# Patient Record
Sex: MALE | Race: WHITE | NOT HISPANIC OR LATINO | Employment: UNEMPLOYED | URBAN - METROPOLITAN AREA
[De-identification: names, ages, dates, MRNs, and addresses within clinical notes are randomized per-mention and may not be internally consistent; named-entity substitution may affect disease eponyms.]

---

## 2021-07-24 VITALS
BODY MASS INDEX: 24.1 KG/M2 | TEMPERATURE: 99.1 F | WEIGHT: 177.91 LBS | HEIGHT: 72 IN | RESPIRATION RATE: 18 BRPM | SYSTOLIC BLOOD PRESSURE: 97 MMHG | OXYGEN SATURATION: 92 % | HEART RATE: 117 BPM | DIASTOLIC BLOOD PRESSURE: 64 MMHG

## 2021-07-24 PROCEDURE — 99284 EMERGENCY DEPT VISIT MOD MDM: CPT

## 2021-07-25 ENCOUNTER — APPOINTMENT (OUTPATIENT)
Dept: RADIOLOGY | Facility: MEDICAL CENTER | Age: 36
End: 2021-07-25
Attending: EMERGENCY MEDICINE
Payer: COMMERCIAL

## 2021-07-25 ENCOUNTER — HOSPITAL ENCOUNTER (EMERGENCY)
Facility: MEDICAL CENTER | Age: 36
End: 2021-07-25
Attending: EMERGENCY MEDICINE
Payer: COMMERCIAL
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
## 2021-07-25 DIAGNOSIS — S09.90XA CLOSED HEAD INJURY, INITIAL ENCOUNTER: ICD-10-CM

## 2021-07-25 DIAGNOSIS — S09.93XA FACIAL INJURY, INITIAL ENCOUNTER: ICD-10-CM

## 2021-07-25 DIAGNOSIS — F10.929 ACUTE ALCOHOLIC INTOXICATION WITH COMPLICATION (HCC): ICD-10-CM

## 2021-07-25 LAB
ALBUMIN SERPL BCP-MCNC: 5 G/DL (ref 3.2–4.9)
ALBUMIN/GLOB SERPL: 2.3 G/DL
ALP SERPL-CCNC: 91 U/L (ref 30–99)
ALT SERPL-CCNC: 22 U/L (ref 2–50)
ANION GAP SERPL CALC-SCNC: 16 MMOL/L (ref 7–16)
APTT PPP: 24.4 SEC (ref 24.7–36)
AST SERPL-CCNC: 29 U/L (ref 12–45)
BASOPHILS # BLD AUTO: 0.6 % (ref 0–1.8)
BASOPHILS # BLD: 0.07 K/UL (ref 0–0.12)
BILIRUB SERPL-MCNC: 1.4 MG/DL (ref 0.1–1.5)
BUN SERPL-MCNC: 13 MG/DL (ref 8–22)
CALCIUM SERPL-MCNC: 9.2 MG/DL (ref 8.5–10.5)
CHLORIDE SERPL-SCNC: 107 MMOL/L (ref 96–112)
CO2 SERPL-SCNC: 21 MMOL/L (ref 20–33)
CREAT SERPL-MCNC: 1.03 MG/DL (ref 0.5–1.4)
EOSINOPHIL # BLD AUTO: 0.04 K/UL (ref 0–0.51)
EOSINOPHIL NFR BLD: 0.3 % (ref 0–6.9)
ERYTHROCYTE [DISTWIDTH] IN BLOOD BY AUTOMATED COUNT: 40.1 FL (ref 35.9–50)
ETHANOL BLD-MCNC: 250.2 MG/DL (ref 0–10)
GLOBULIN SER CALC-MCNC: 2.2 G/DL (ref 1.9–3.5)
GLUCOSE SERPL-MCNC: 104 MG/DL (ref 65–99)
HCT VFR BLD AUTO: 52.4 % (ref 42–52)
HGB BLD-MCNC: 18 G/DL (ref 14–18)
IMM GRANULOCYTES # BLD AUTO: 0.04 K/UL (ref 0–0.11)
IMM GRANULOCYTES NFR BLD AUTO: 0.3 % (ref 0–0.9)
INR PPP: 1.04 (ref 0.87–1.13)
LYMPHOCYTES # BLD AUTO: 2.16 K/UL (ref 1–4.8)
LYMPHOCYTES NFR BLD: 18.5 % (ref 22–41)
MCH RBC QN AUTO: 29.7 PG (ref 27–33)
MCHC RBC AUTO-ENTMCNC: 34.4 G/DL (ref 33.7–35.3)
MCV RBC AUTO: 86.5 FL (ref 81.4–97.8)
MONOCYTES # BLD AUTO: 0.4 K/UL (ref 0–0.85)
MONOCYTES NFR BLD AUTO: 3.4 % (ref 0–13.4)
NEUTROPHILS # BLD AUTO: 8.95 K/UL (ref 1.82–7.42)
NEUTROPHILS NFR BLD: 76.9 % (ref 44–72)
NRBC # BLD AUTO: 0 K/UL
NRBC BLD-RTO: 0 /100 WBC
PLATELET # BLD AUTO: 213 K/UL (ref 164–446)
PMV BLD AUTO: 10.7 FL (ref 9–12.9)
POTASSIUM SERPL-SCNC: 4.1 MMOL/L (ref 3.6–5.5)
PROT SERPL-MCNC: 7.2 G/DL (ref 6–8.2)
PROTHROMBIN TIME: 13.3 SEC (ref 12–14.6)
RBC # BLD AUTO: 6.06 M/UL (ref 4.7–6.1)
SODIUM SERPL-SCNC: 144 MMOL/L (ref 135–145)
WBC # BLD AUTO: 11.7 K/UL (ref 4.8–10.8)

## 2021-07-25 PROCEDURE — 82077 ASSAY SPEC XCP UR&BREATH IA: CPT

## 2021-07-25 PROCEDURE — 80053 COMPREHEN METABOLIC PANEL: CPT

## 2021-07-25 PROCEDURE — 85025 COMPLETE CBC W/AUTO DIFF WBC: CPT

## 2021-07-25 PROCEDURE — 70486 CT MAXILLOFACIAL W/O DYE: CPT

## 2021-07-25 PROCEDURE — 304999 HCHG REPAIR-SIMPLE/INTERMED LEVEL 1

## 2021-07-25 PROCEDURE — 85610 PROTHROMBIN TIME: CPT

## 2021-07-25 PROCEDURE — 304217 HCHG IRRIGATION SYSTEM

## 2021-07-25 PROCEDURE — 303747 HCHG EXTRA SUTURE

## 2021-07-25 PROCEDURE — 72125 CT NECK SPINE W/O DYE: CPT

## 2021-07-25 PROCEDURE — 85730 THROMBOPLASTIN TIME PARTIAL: CPT

## 2021-07-25 PROCEDURE — 70450 CT HEAD/BRAIN W/O DYE: CPT

## 2021-07-25 RX ORDER — SODIUM CHLORIDE 9 MG/ML
1000 INJECTION, SOLUTION INTRAVENOUS ONCE
Status: DISCONTINUED | OUTPATIENT
Start: 2021-07-25 | End: 2021-07-25 | Stop reason: HOSPADM

## 2021-07-25 NOTE — ED NOTES
PT restless and not re directing easily. PT removed IV bleeding controled.  PT able to walk around room with a steady gait
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
## 2021-07-25 NOTE — ED PROVIDER NOTES
ED Provider Note    0500 -patient signed out to me by my colleague, Dr. Curran, for reevaluation of final disposition after CT results and upon sobriety.  Please refer to his note for complete details.    CT-MAXILLOFACIAL W/O PLUS RECONS   Final Result      1.  Nondisplaced fracture involving the left mandibular ramus superiorly adjacent to the base of the mandibular condyle.      2.  Soft tissue injury.      CT-HEAD W/O   Final Result      No evidence of acute intracranial process.      CT-CSPINE WITHOUT PLUS RECONS   Final Result      No evidence of cervical spine fracture.        0600 -CTs as above.  Patient has nondisplaced fracture involving the left mandibular ramus.  CT head is unremarkable.  CT cervical spine is unremarkable.  He is sleeping, will discuss findings with him and reevaluate upon sobriety.    0631 -notified by nursing staff that patient has eloped.  Refused to wait for discussion or update on imaging.
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
## 2021-09-07 ENCOUNTER — TELEPHONE (OUTPATIENT)
Dept: SCHEDULING | Facility: IMAGING CENTER | Age: 36
End: 2021-09-07

## 2022-03-24 ENCOUNTER — OFFICE VISIT (OUTPATIENT)
Dept: URGENT CARE | Facility: CLINIC | Age: 37
End: 2022-03-24
Payer: COMMERCIAL

## 2022-03-24 VITALS
DIASTOLIC BLOOD PRESSURE: 68 MMHG | BODY MASS INDEX: 23.49 KG/M2 | OXYGEN SATURATION: 98 % | WEIGHT: 173.4 LBS | SYSTOLIC BLOOD PRESSURE: 122 MMHG | HEIGHT: 72 IN | RESPIRATION RATE: 18 BRPM | HEART RATE: 88 BPM | TEMPERATURE: 100.1 F

## 2022-03-24 DIAGNOSIS — J02.9 EXUDATIVE PHARYNGITIS: ICD-10-CM

## 2022-03-24 LAB
INT CON NEG: NORMAL
INT CON POS: NORMAL
S PYO AG THROAT QL: NEGATIVE

## 2022-03-24 PROCEDURE — 99203 OFFICE O/P NEW LOW 30 MIN: CPT | Performed by: PHYSICIAN ASSISTANT

## 2022-03-24 PROCEDURE — 87880 STREP A ASSAY W/OPTIC: CPT | Performed by: PHYSICIAN ASSISTANT

## 2022-03-24 RX ORDER — QUETIAPINE FUMARATE 200 MG/1
200 TABLET, FILM COATED ORAL
COMMUNITY

## 2022-03-24 RX ORDER — DEXAMETHASONE SODIUM PHOSPHATE 4 MG/ML
4 INJECTION, SOLUTION INTRA-ARTICULAR; INTRALESIONAL; INTRAMUSCULAR; INTRAVENOUS; SOFT TISSUE ONCE
Status: COMPLETED | OUTPATIENT
Start: 2022-03-24 | End: 2022-03-24

## 2022-03-24 RX ORDER — DEXTROAMPHETAMINE SACCHARATE, AMPHETAMINE ASPARTATE, DEXTROAMPHETAMINE SULFATE AND AMPHETAMINE SULFATE 5; 5; 5; 5 MG/1; MG/1; MG/1; MG/1
20 TABLET ORAL 2 TIMES DAILY
COMMUNITY

## 2022-03-24 RX ORDER — LIDOCAINE HYDROCHLORIDE 20 MG/ML
5 SOLUTION OROPHARYNGEAL PRN
Qty: 120 ML | Refills: 0 | Status: SHIPPED | OUTPATIENT
Start: 2022-03-24

## 2022-03-24 RX ADMIN — DEXAMETHASONE SODIUM PHOSPHATE 4 MG: 4 INJECTION, SOLUTION INTRA-ARTICULAR; INTRALESIONAL; INTRAMUSCULAR; INTRAVENOUS; SOFT TISSUE at 15:19

## 2022-03-24 ASSESSMENT — ENCOUNTER SYMPTOMS
DIARRHEA: 0
ABDOMINAL PAIN: 0
CHILLS: 1
VOMITING: 0
COUGH: 0
NAUSEA: 0
MYALGIAS: 1
SPUTUM PRODUCTION: 0
WHEEZING: 0
FEVER: 1
SHORTNESS OF BREATH: 0
SORE THROAT: 1

## 2022-03-24 ASSESSMENT — FIBROSIS 4 INDEX: FIB4 SCORE: 1.02

## 2022-03-24 NOTE — PROGRESS NOTES
Subjective:     Gorge Bailey  is a 37 y.o. male who presents for Pharyngitis (GLANDS SWOLLEN/CHEST CONGESTION/3DAYS)      Pharyngitis   Associated symptoms include congestion. Pertinent negatives include no abdominal pain, coughing, diarrhea, ear pain, shortness of breath or vomiting.   Patient presents urgent care complaining last 2 to 3 days of noting sore throat.  Complains of throat pain with swallowing saliva as well as liquids.  Complains of sensation of full and uncomfortable lymph nodes.  Has noted subjective fevers and chills over the last 2 days as well.  Denies cough.  Denies ear pain.  Complains of sinus congestion.  Denies nausea vomiting abdominal pain diarrhea or rash.  Denies known exposure to strep pharyngitis.  Has tried no treatments thus far.    Review of Systems   Constitutional: Positive for chills and fever ( subj).   HENT: Positive for congestion and sore throat. Negative for ear pain.    Respiratory: Negative for cough, sputum production, shortness of breath and wheezing.    Gastrointestinal: Negative for abdominal pain, diarrhea, nausea and vomiting.   Musculoskeletal: Positive for myalgias.   Skin: Negative for rash.       Medications:    • amphetamine-dextroamphetamine Tabs  • QUEtiapine Tabs    Allergies: Patient has no known allergies.    Problem List: Gorge Bailey does not have a problem list on file.    Surgical History:  No past surgical history on file.    Past Social Hx: Gorge Bailey  reports that he has never smoked. He has never used smokeless tobacco. He reports that he does not drink alcohol.     Past Family Hx:  Gorge Bailey family history is not on file.     Problem list, medications, and allergies reviewed by myself today in Epic.     Objective:   /68 (BP Location: Left arm, Patient Position: Sitting)   Pulse 88   Temp 37.8 °C (100.1 °F) (Temporal)   Resp 18   Ht 1.829 m (6')   Wt 78.7 kg (173 lb 6.4 oz)   SpO2 98%   BMI 23.52 kg/m²     Physical  Exam  Vitals and nursing note reviewed.   Constitutional:       General: He is not in acute distress.     Appearance: He is well-developed. He is not diaphoretic.   HENT:      Head: Normocephalic and atraumatic.      Right Ear: Tympanic membrane, ear canal and external ear normal.      Left Ear: Tympanic membrane, ear canal and external ear normal.      Nose: Nose normal.      Mouth/Throat:      Lips: Pink.      Mouth: Mucous membranes are moist.      Pharynx: Uvula midline. Oropharyngeal exudate and posterior oropharyngeal erythema (deeper) present.      Tonsils: No tonsillar exudate or tonsillar abscesses. 1+ on the right. 1+ on the left.   Eyes:      General: No scleral icterus.        Right eye: No discharge.         Left eye: No discharge.      Conjunctiva/sclera: Conjunctivae normal.   Pulmonary:      Effort: Pulmonary effort is normal. No respiratory distress.      Breath sounds: No decreased breath sounds, wheezing, rhonchi or rales.   Musculoskeletal:         General: Normal range of motion.      Cervical back: Neck supple.   Lymphadenopathy:      Cervical: Cervical adenopathy ( mild bilat) present.   Skin:     General: Skin is warm and dry.      Coloration: Skin is not pale.   Neurological:      Mental Status: He is alert and oriented to person, place, and time.      Coordination: Coordination normal.     POCT strep - NEG    Decadron 4 mg oral-tolerates well    Assessment/Plan:   Assessment      1. Exudative pharyngitis  - POCT Rapid Strep A  - dexamethasone (DECADRON) injection 4 mg  - lidocaine (XYLOCAINE) 2 % Solution; Take 5 mL by mouth as needed for Throat/Mouth Pain (q6hr PRN throat pain, ok to rinse and spit or swallow).  Dispense: 120 mL; Refill: 0    Other orders  - amphetamine-dextroamphetamine (ADDERALL) 20 MG Tab; Take 20 mg by mouth 2 times a day.  - QUEtiapine (SEROQUEL) 200 MG Tab; Take 200 mg by mouth at bedtime as needed.  May repeat 1 time..  Supportive care is reviewed with  patient/caregiver - recommend to push PO fluids and electrolytes,  take full course of Rx, take with probiotics, observe for resolution  Decadron and viscous lidocaine for discomfort  Return to clinic with lack of resolution or progression of symptoms.      I have worn an N95 mask, gloves and eye protection for the entire encounter with this patient.     Differential diagnosis, natural history, supportive care, and indications for immediate follow-up discussed.

## 2022-09-10 ENCOUNTER — HOSPITAL ENCOUNTER (EMERGENCY)
Facility: MEDICAL CENTER | Age: 37
End: 2022-09-10
Attending: EMERGENCY MEDICINE
Payer: COMMERCIAL

## 2022-09-10 VITALS
HEART RATE: 98 BPM | HEIGHT: 72 IN | BODY MASS INDEX: 23.43 KG/M2 | DIASTOLIC BLOOD PRESSURE: 72 MMHG | WEIGHT: 173 LBS | RESPIRATION RATE: 20 BRPM | SYSTOLIC BLOOD PRESSURE: 110 MMHG | OXYGEN SATURATION: 96 % | TEMPERATURE: 97.2 F

## 2022-09-10 DIAGNOSIS — F10.920 ACUTE ALCOHOLIC INTOXICATION WITHOUT COMPLICATION (HCC): ICD-10-CM

## 2022-09-10 DIAGNOSIS — F19.10: ICD-10-CM

## 2022-09-10 DIAGNOSIS — T50.901A ACCIDENTAL DRUG OVERDOSE, INITIAL ENCOUNTER: ICD-10-CM

## 2022-09-10 LAB
ALBUMIN SERPL BCP-MCNC: 4.5 G/DL (ref 3.2–4.9)
ALBUMIN/GLOB SERPL: 1.9 G/DL
ALP SERPL-CCNC: 86 U/L (ref 30–99)
ALT SERPL-CCNC: 20 U/L (ref 2–50)
AMPHET UR QL SCN: NEGATIVE
ANION GAP SERPL CALC-SCNC: 20 MMOL/L (ref 7–16)
AST SERPL-CCNC: 36 U/L (ref 12–45)
BARBITURATES UR QL SCN: NEGATIVE
BASOPHILS # BLD AUTO: 0.1 % (ref 0–1.8)
BASOPHILS # BLD: 0.01 K/UL (ref 0–0.12)
BENZODIAZ UR QL SCN: NEGATIVE
BILIRUB SERPL-MCNC: 0.7 MG/DL (ref 0.1–1.5)
BUN SERPL-MCNC: 13 MG/DL (ref 8–22)
BZE UR QL SCN: POSITIVE
CALCIUM SERPL-MCNC: 8.5 MG/DL (ref 8.5–10.5)
CANNABINOIDS UR QL SCN: NEGATIVE
CHLORIDE SERPL-SCNC: 106 MMOL/L (ref 96–112)
CO2 SERPL-SCNC: 16 MMOL/L (ref 20–33)
CREAT SERPL-MCNC: 1.08 MG/DL (ref 0.5–1.4)
EKG IMPRESSION: NORMAL
EOSINOPHIL # BLD AUTO: 0.03 K/UL (ref 0–0.51)
EOSINOPHIL NFR BLD: 0.4 % (ref 0–6.9)
ERYTHROCYTE [DISTWIDTH] IN BLOOD BY AUTOMATED COUNT: 44.1 FL (ref 35.9–50)
ETHANOL BLD-MCNC: 286.6 MG/DL
GFR SERPLBLD CREATININE-BSD FMLA CKD-EPI: 91 ML/MIN/1.73 M 2
GLOBULIN SER CALC-MCNC: 2.4 G/DL (ref 1.9–3.5)
GLUCOSE SERPL-MCNC: 139 MG/DL (ref 65–99)
HCT VFR BLD AUTO: 49.5 % (ref 42–52)
HGB BLD-MCNC: 16.8 G/DL (ref 14–18)
IMM GRANULOCYTES # BLD AUTO: 0.17 K/UL (ref 0–0.11)
IMM GRANULOCYTES NFR BLD AUTO: 2 % (ref 0–0.9)
LYMPHOCYTES # BLD AUTO: 1.76 K/UL (ref 1–4.8)
LYMPHOCYTES NFR BLD: 21 % (ref 22–41)
MCH RBC QN AUTO: 30.9 PG (ref 27–33)
MCHC RBC AUTO-ENTMCNC: 33.9 G/DL (ref 33.7–35.3)
MCV RBC AUTO: 91.2 FL (ref 81.4–97.8)
METHADONE UR QL SCN: NEGATIVE
MONOCYTES # BLD AUTO: 0.29 K/UL (ref 0–0.85)
MONOCYTES NFR BLD AUTO: 3.5 % (ref 0–13.4)
NEUTROPHILS # BLD AUTO: 6.11 K/UL (ref 1.82–7.42)
NEUTROPHILS NFR BLD: 73 % (ref 44–72)
NRBC # BLD AUTO: 0 K/UL
NRBC BLD-RTO: 0 /100 WBC
OPIATES UR QL SCN: NEGATIVE
OXYCODONE UR QL SCN: NEGATIVE
PCP UR QL SCN: NEGATIVE
PLATELET # BLD AUTO: 228 K/UL (ref 164–446)
PMV BLD AUTO: 10 FL (ref 9–12.9)
POTASSIUM SERPL-SCNC: 3.7 MMOL/L (ref 3.6–5.5)
PROPOXYPH UR QL SCN: NEGATIVE
PROT SERPL-MCNC: 6.9 G/DL (ref 6–8.2)
RBC # BLD AUTO: 5.43 M/UL (ref 4.7–6.1)
SODIUM SERPL-SCNC: 142 MMOL/L (ref 135–145)
TROPONIN T SERPL-MCNC: 7 NG/L (ref 6–19)
WBC # BLD AUTO: 8.4 K/UL (ref 4.8–10.8)

## 2022-09-10 PROCEDURE — 93005 ELECTROCARDIOGRAM TRACING: CPT

## 2022-09-10 PROCEDURE — 80053 COMPREHEN METABOLIC PANEL: CPT

## 2022-09-10 PROCEDURE — 93005 ELECTROCARDIOGRAM TRACING: CPT | Performed by: EMERGENCY MEDICINE

## 2022-09-10 PROCEDURE — 82077 ASSAY SPEC XCP UR&BREATH IA: CPT

## 2022-09-10 PROCEDURE — 84484 ASSAY OF TROPONIN QUANT: CPT

## 2022-09-10 PROCEDURE — 80307 DRUG TEST PRSMV CHEM ANLYZR: CPT

## 2022-09-10 PROCEDURE — 99285 EMERGENCY DEPT VISIT HI MDM: CPT

## 2022-09-10 PROCEDURE — 36415 COLL VENOUS BLD VENIPUNCTURE: CPT

## 2022-09-10 PROCEDURE — 700105 HCHG RX REV CODE 258: Performed by: EMERGENCY MEDICINE

## 2022-09-10 PROCEDURE — 85025 COMPLETE CBC W/AUTO DIFF WBC: CPT

## 2022-09-10 RX ORDER — SODIUM CHLORIDE 9 MG/ML
1000 INJECTION, SOLUTION INTRAVENOUS ONCE
Status: COMPLETED | OUTPATIENT
Start: 2022-09-10 | End: 2022-09-10

## 2022-09-10 RX ADMIN — SODIUM CHLORIDE 1000 ML: 9 INJECTION, SOLUTION INTRAVENOUS at 04:29

## 2022-09-10 ASSESSMENT — FIBROSIS 4 INDEX: FIB4 SCORE: 1.02

## 2022-09-10 NOTE — DISCHARGE INSTRUCTIONS
You likely had fentanyl laced in the drugs that you are using tonight.  Please be cautious in the future as this could have caused you to die from a nonintentional overdose.  Increase fluids for the next 24 hours that are nonalcoholic.  Return if problems

## 2022-09-10 NOTE — ED PROVIDER NOTES
ED Provider Note     Scribed for Jovana Mcnamara D.O. by Joe Dean. 9/10/2022, 3:45 AM.     Primary care provider: Pcp Pt States None  Means of arrival: EMS         History obtained from: Patient  History limited by: None    CHIEF COMPLAINT  Chief Complaint   Patient presents with    Drug Overdose       HPI  Gorge Bailey is a 37 y.o. male who presents to the emergency Department via EMS for drug overdose. The patient arrived via EMS after a positive response to Narcan when found unresponsive. The patient admits to using cocaine with friends, but denies using heroin, however he admits that he did not know what the cocaine could have been laced with. The patient denies any history of heroin use.    REVIEW OF SYSTEMS  Pertinent positives include positive response to Narcan. Pertinent negatives include no fever.   See HPI for further details. All other systems are negative.    PAST MEDICAL HISTORY  Not noted.    FAMILY HISTORY  Not noted.    SOCIAL HISTORY  Social History     Tobacco Use    Smoking status: Never    Smokeless tobacco: Never    Tobacco comments:     unknown, pt refusing to answer   Vaping Use    Vaping Use: Unknown   Substance Use Topics    Alcohol use: Never      Social History     Substance and Sexual Activity   Drug Use Cocaine 9/10/22    Comment: unknown, pt refusing to answer       SURGICAL HISTORY  Not noted.    CURRENT MEDICATIONS  No current facility-administered medications for this encounter.    Current Outpatient Medications:     amphetamine-dextroamphetamine (ADDERALL) 20 MG Tab, Take 20 mg by mouth 2 times a day., Disp: , Rfl:     QUEtiapine (SEROQUEL) 200 MG Tab, Take 200 mg by mouth at bedtime as needed.  May repeat 1 time.., Disp: , Rfl:     lidocaine (XYLOCAINE) 2 % Solution, Take 5 mL by mouth as needed for Throat/Mouth Pain (q6hr PRN throat pain, ok to rinse and spit or swallow)., Disp: 120 mL, Rfl: 0    ALLERGIES  No Known Allergies    PHYSICAL EXAM  VITAL SIGNS: /76   Pulse  (!) 105   Temp 36.1 °C (97 °F) (Temporal)   Resp 20   Ht 1.829 m (6')   Wt 78.5 kg (173 lb)   SpO2 91%   BMI 23.46 kg/m²     Constitutional: Patient is awake and alert. well developed, well nourished. Strong scent of alcohol on his breath. No acute distress.   HENT: Normocephalic, atraumatic. Nose normal with no mucosal edema or drainage. Oropharynx moist without erythema or exudates.  Eyes: Pin-point pupils, EOMI, Conjunctiva without erythema or exudates.   Neck: Supple   Lymphatic: No lymphadenopathy noted.   Cardiovascular: Normal heart rate and Regular rhythm. No murmur  Thorax & Lungs: Clear and equal breath sounds with good excursion. No respiratory distress, no rhonchi, wheezing or rales. No chest tenderness,  or signs of trauma .  Abdomen: Bowel sounds normal in all four quadrants. Soft,nontender  Skin: Warm, Dry, multiple tattoos, no rashes or abrasions  Back: No cervical, thoracic, or lumbosacral tenderness.   Extremities: Peripheral pulses 4/4 No edema, No tenderness  Musculoskeletal: Normal range of motion in all major joints. No tenderness to palpation or major deformities noted.   Neurologic: Alert & oriented x 3, Normal motor function, Normal sensory function, No lateralizing or focal deficits noted. DTR's 4/4 bilaterally.  Psychiatric: Affect very argumentative, judgment is impaired due to alcohol and drugs    DIAGNOSTICS/PROCEDURES    LABS  Results for orders placed or performed during the hospital encounter of 09/10/22   DIAGNOSTIC ALCOHOL   Result Value Ref Range    Diagnostic Alcohol 286.6 (H) <10.1 mg/dL   URINE DRUG SCREEN   Result Value Ref Range    Amphetamines Urine Negative Negative    Barbiturates Negative Negative    Benzodiazepines Negative Negative    Cocaine Metabolite Positive (A) Negative    Methadone Negative Negative    Opiates Negative Negative    Oxycodone Negative Negative    Phencyclidine -Pcp Negative Negative    Propoxyphene Negative Negative    Cannabinoid Metab  Negative Negative   CBC WITH DIFFERENTIAL   Result Value Ref Range    WBC 8.4 4.8 - 10.8 K/uL    RBC 5.43 4.70 - 6.10 M/uL    Hemoglobin 16.8 14.0 - 18.0 g/dL    Hematocrit 49.5 42.0 - 52.0 %    MCV 91.2 81.4 - 97.8 fL    MCH 30.9 27.0 - 33.0 pg    MCHC 33.9 33.7 - 35.3 g/dL    RDW 44.1 35.9 - 50.0 fL    Platelet Count 228 164 - 446 K/uL    MPV 10.0 9.0 - 12.9 fL    Neutrophils-Polys 73.00 (H) 44.00 - 72.00 %    Lymphocytes 21.00 (L) 22.00 - 41.00 %    Monocytes 3.50 0.00 - 13.40 %    Eosinophils 0.40 0.00 - 6.90 %    Basophils 0.10 0.00 - 1.80 %    Immature Granulocytes 2.00 (H) 0.00 - 0.90 %    Nucleated RBC 0.00 /100 WBC    Neutrophils (Absolute) 6.11 1.82 - 7.42 K/uL    Lymphs (Absolute) 1.76 1.00 - 4.80 K/uL    Monos (Absolute) 0.29 0.00 - 0.85 K/uL    Eos (Absolute) 0.03 0.00 - 0.51 K/uL    Baso (Absolute) 0.01 0.00 - 0.12 K/uL    Immature Granulocytes (abs) 0.17 (H) 0.00 - 0.11 K/uL    NRBC (Absolute) 0.00 K/uL   Comp Metabolic Panel   Result Value Ref Range    Sodium 142 135 - 145 mmol/L    Potassium 3.7 3.6 - 5.5 mmol/L    Chloride 106 96 - 112 mmol/L    Co2 16 (L) 20 - 33 mmol/L    Anion Gap 20.0 (H) 7.0 - 16.0    Glucose 139 (H) 65 - 99 mg/dL    Bun 13 8 - 22 mg/dL    Creatinine 1.08 0.50 - 1.40 mg/dL    Calcium 8.5 8.5 - 10.5 mg/dL    AST(SGOT) 36 12 - 45 U/L    ALT(SGPT) 20 2 - 50 U/L    Alkaline Phosphatase 86 30 - 99 U/L    Total Bilirubin 0.7 0.1 - 1.5 mg/dL    Albumin 4.5 3.2 - 4.9 g/dL    Total Protein 6.9 6.0 - 8.2 g/dL    Globulin 2.4 1.9 - 3.5 g/dL    A-G Ratio 1.9 g/dL   TROPONIN   Result Value Ref Range    Troponin T 7 6 - 19 ng/L   ESTIMATED GFR   Result Value Ref Range    GFR (CKD-EPI) 91 >60 mL/min/1.73 m 2   EKG (NOW)   Result Value Ref Range    Report       [facility] Emergency Dept.    Test Date:  2022-09-10  Pt Name:    WALTER MARCOS               Department: ER  MRN:        [id number]                      Room:       RD 02  Gender:     Male                          Technician: [id number]  :        1985                   Requested By:ER TRIAGE PROTOCOL  Order #:    [id number]                    Reading MD:    Measurements  Intervals                                Axis  Rate:       106                          P:          49  NV:         150                          QRS:        31  QRSD:       100                          T:          33  QT:         364  QTc:        484    Interpretive Statements  Sinus tachycardia  Borderline prolonged QT interval  No previous ECG available for comparison        Labs reviewed by me    EKG  Results for orders placed or performed during the hospital encounter of 09/10/22   EKG (NOW)   Result Value Ref Range    Report       [facility] Emergency Dept.    Test Date:  2022-09-10  Pt Name:    WALTER RODRÍGUEZ               Department: ER  MRN:        [id number]                      Room:        02  Gender:     Male                         Technician: [id number]  :        1985                   Requested By:ER TRIAGE PROTOCOL  Order #:    [id number]                    Reading MD:    Measurements  Intervals                                Axis  Rate:       106                          P:          49  NV:         150                          QRS:        31  QRSD:       100                          T:          33  QT:         364  QTc:        484    Interpretive Statements  Sinus tachycardia  Borderline prolonged QT interval  No previous ECG available for comparison          COURSE & MEDICAL DECISION MAKING  Pertinent Labs & Imaging studies reviewed. (See chart for details)    3:45 AM - Patient seen and evaluated at bedside. Ordered for daignostic alcohol, urine drug screen to evaluate. Differential diagnoses include, but are not limited to, drug overdose    4:19 AM - Ordered NS infusion 1L to treat symptoms.    HYDRATION: Based on the patient's presentation of Tachycardia the patient was given IV fluids. IV Hydration was used  because oral hydration was not adequate alone. Upon recheck following hydration, the patient was improved.   Labs were unremarkable, twelve-lead EKG shows sinus tachycardia at a rate of 106 bpm.  His troponin is negative at 7.  His glucose was slightly elevated at 139, CO2 is 16, white count is normal H&H is stable and his blood alcohol level was 286.  He remained stable, urine drug screen is currently pending and I have told him he can look it up on [facility] for further information.  He is discharged in stable and improved condition.          FINAL IMPRESSION  1. Accidental drug overdose, initial encounter    2. Acute alcoholic intoxication without complication (HCC)    3. Polydrug abuse, continuous (HCC)         Joe GUILLEN (Scribe), am scribing for, and in the presence of, Jovana Mcnamara D.O..    Electronically signed by: Joe Dean (Yan), 9/10/2022    Jovana GUILLEN D.O. personally performed the services described in this documentation, as scribed by Joe Dean in my presence, and it is both accurate and complete.    The note accurately reflects work and decisions made by me.  Jovana Mcnamara D.O.  9/10/2022  6:40 AM

## 2022-09-10 NOTE — ED NOTES
Fluid bolus started. O2 turned off, pt maintaining saturation above 90%. No signs of respiratory depression noted.
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
## 2022-09-10 NOTE — ED NOTES
Pt brother, Tima Montaño, would like to know when patient is released from hospital.     [phone]

## 2024-05-01 ENCOUNTER — APPOINTMENT (OUTPATIENT)
Dept: BEHAVIORAL HEALTH | Facility: CLINIC | Age: 39
End: 2024-05-01

## 2024-05-01 DIAGNOSIS — F51.01 PRIMARY INSOMNIA: ICD-10-CM

## 2024-05-01 DIAGNOSIS — F90.2 ATTENTION DEFICIT HYPERACTIVITY DISORDER, COMBINED TYPE: ICD-10-CM

## 2024-05-01 DIAGNOSIS — F41.1 GENERALIZED ANXIETY DISORDER: ICD-10-CM

## 2024-05-01 PROCEDURE — 90792 PSYCH DIAG EVAL W/MED SRVCS: CPT | Performed by: PSYCHIATRY & NEUROLOGY

## 2024-05-01 RX ORDER — ALPRAZOLAM 1 MG/1
1 TABLET ORAL DAILY
Qty: 30 TABLET | Refills: 0 | Status: SHIPPED | OUTPATIENT
Start: 2024-05-01 | End: 2024-05-31

## 2024-05-01 RX ORDER — DEXTROAMPHETAMINE SACCHARATE, AMPHETAMINE ASPARTATE, DEXTROAMPHETAMINE SULFATE AND AMPHETAMINE SULFATE 7.5; 7.5; 7.5; 7.5 MG/1; MG/1; MG/1; MG/1
30 TABLET ORAL 2 TIMES DAILY
Qty: 60 TABLET | Refills: 0 | Status: SHIPPED | OUTPATIENT
Start: 2024-05-01 | End: 2024-05-31

## 2024-05-01 NOTE — PROGRESS NOTES
"[facility]   Therapy Progress Note      This provider informed the patient their medical records are totally confidential except for the use by other providers involved in their care, or if the patient signs a release, or to report instances of child or elder abuse, or if it is determined they are an immediate risk to harm themselves or others.    Name: Gorge Bailey  MRN: [id number]  : 1985  Age: 39 y.o.  Date of assessment: 2024  PCP: Pcp Pt States None      Present Illness:   Chart reviewed prior to seeing him in my office.  He is 39, single, never , and has no children.  He works as a  at a local restaurant.  He grew up in [place] until moving to [place] at age 11..  He was diagnosed in [place] at age 11 as having ADHD.  He is restless, distractible, and impulsive at times.  He has trouble completing projects and difficulty staying focused on movies and books.  He recalls disrupting class in grade school but was never physically aggressive.  He was never thought to have bipolar disorder.  He does experience \" crippling anxiety\".  Denies abrupt mood swings for no particular reason.  He denies spending sprees, episodes of herrera, and grandiosity.    Past Psych History:   See present illness.  No previous psychiatric hospitalization or suicidal attempt.    Substance Abuse History:  No alcohol or substance abuse problem.  He has 1 cocktail per month.    Family History:   He has 2 brothers.  His 39-year-old brother has ADHD.  His parents  when he was 22 years old.    Medical History:  No significant medical problems    Psych Medications:  He is currently on Adderall 30 mg twice daily.  He has taken Seroquel 100 to 200 mg at bedtime for insomnia.  Trazodone apparently did not help his insomnia.  He was previously on Xanax 0.5 mg years ago.    Allergies:   None known    Mental Status:   He is alert, oriented, and cooperative.  Relatedness is good.  Grooming is good.  " Speech is normal rate.  Anxious.  Memory is good.  Insight and judgment are good.  No indication of psychotic thinking.    Current Risk:       Suicidal: Not suicidal       Homicidal: Not homicidal       Self-Harm: No plan to harm self       Relapse (Low/Moderate/High): Moderate       Crisis Safety Plan Reviewed: Discussed with patient    Diagnosis:  ADHD  Generalized anxiety disorder  Insomnia    Treatment Plan:  The current treatment plan consists of quarterly psychiatric sessions designed to evaluate his ADHD, anxiety, and insomnia.    Duration will be for a minimum of 12 months and will be reviewed at each visit.    Goals: Improvement in ADHD symptoms and sleep with control of anxiety in order to prevent relapse due to the chronic nature of his behavioral health problems and mental illness.  Continue Adderall 30 mg twice daily.  Possible side effects discussed.  Use Xanax 1 mg for severe anxiety.      Lenny Holguin M.D.     This note was created using voice recognition software (Dragon). The accuracy of the dictation is limited by the abilities of the software. I have reviewed the note prior to signing, however some errors in grammar and context are still possible. If you have any questions related to this note please do not hesitate to contact our office.

## 2024-06-04 DIAGNOSIS — F90.2 ATTENTION DEFICIT HYPERACTIVITY DISORDER, COMBINED TYPE: ICD-10-CM

## 2024-06-04 DIAGNOSIS — F41.1 GENERALIZED ANXIETY DISORDER: ICD-10-CM

## 2024-06-04 RX ORDER — DEXTROAMPHETAMINE SACCHARATE, AMPHETAMINE ASPARTATE, DEXTROAMPHETAMINE SULFATE AND AMPHETAMINE SULFATE 7.5; 7.5; 7.5; 7.5 MG/1; MG/1; MG/1; MG/1
30 TABLET ORAL 2 TIMES DAILY
Qty: 60 TABLET | Refills: 0 | Status: SHIPPED | OUTPATIENT
Start: 2024-06-04 | End: 2024-07-04

## 2024-06-04 RX ORDER — ALPRAZOLAM 1 MG/1
1 TABLET ORAL DAILY
Qty: 30 TABLET | Refills: 0 | Status: SHIPPED | OUTPATIENT
Start: 2024-06-04 | End: 2024-07-04

## 2024-07-03 DIAGNOSIS — F41.1 GENERALIZED ANXIETY DISORDER: ICD-10-CM

## 2024-07-03 DIAGNOSIS — F90.2 ATTENTION DEFICIT HYPERACTIVITY DISORDER, COMBINED TYPE: ICD-10-CM

## 2024-07-03 RX ORDER — DEXTROAMPHETAMINE SACCHARATE, AMPHETAMINE ASPARTATE, DEXTROAMPHETAMINE SULFATE AND AMPHETAMINE SULFATE 7.5; 7.5; 7.5; 7.5 MG/1; MG/1; MG/1; MG/1
30 TABLET ORAL 2 TIMES DAILY
Qty: 60 TABLET | Refills: 0 | Status: SHIPPED | OUTPATIENT
Start: 2024-07-03 | End: 2024-08-02

## 2024-07-03 RX ORDER — ALPRAZOLAM 1 MG/1
1 TABLET ORAL DAILY
Qty: 30 TABLET | Refills: 0 | Status: SHIPPED | OUTPATIENT
Start: 2024-07-03 | End: 2024-08-02

## 2024-08-01 ENCOUNTER — APPOINTMENT (OUTPATIENT)
Dept: BEHAVIORAL HEALTH | Facility: CLINIC | Age: 39
End: 2024-08-01

## 2024-08-01 DIAGNOSIS — F90.2 ATTENTION DEFICIT HYPERACTIVITY DISORDER, COMBINED TYPE: ICD-10-CM

## 2024-08-01 DIAGNOSIS — F51.01 PRIMARY INSOMNIA: ICD-10-CM

## 2024-08-01 DIAGNOSIS — F41.1 GENERALIZED ANXIETY DISORDER: ICD-10-CM

## 2024-08-01 PROCEDURE — 99214 OFFICE O/P EST MOD 30 MIN: CPT | Performed by: PSYCHIATRY & NEUROLOGY

## 2024-08-01 RX ORDER — DEXTROAMPHETAMINE SACCHARATE, AMPHETAMINE ASPARTATE, DEXTROAMPHETAMINE SULFATE AND AMPHETAMINE SULFATE 7.5; 7.5; 7.5; 7.5 MG/1; MG/1; MG/1; MG/1
30 TABLET ORAL 2 TIMES DAILY
Qty: 60 TABLET | Refills: 0 | Status: SHIPPED | OUTPATIENT
Start: 2024-08-01 | End: 2024-08-29 | Stop reason: SDUPTHER

## 2024-08-01 RX ORDER — QUETIAPINE FUMARATE 200 MG/1
200 TABLET, FILM COATED ORAL NIGHTLY
Qty: 60 TABLET | Refills: 0 | Status: SHIPPED | OUTPATIENT
Start: 2024-08-01 | End: 2024-08-29 | Stop reason: SDUPTHER

## 2024-08-01 RX ORDER — ALPRAZOLAM 1 MG
1 TABLET ORAL 2 TIMES DAILY
Qty: 60 TABLET | Refills: 0 | Status: SHIPPED | OUTPATIENT
Start: 2024-08-01 | End: 2024-08-29 | Stop reason: SDUPTHER

## 2024-08-01 NOTE — PROGRESS NOTES
[facility]   Follow Up Assessment     This provider informed the patient their medical records are totally confidential except for the use by other providers involved in their care, or if the patient signs a release, or to report instances of child or elder abuse, or if it is determined they are an immediate risk to harm themselves or others.    Name: Gorge Bailey  MRN: [id number]  : 1985  Age: 39 y.o.  Date of assessment: 2024  PCP: Pcp Pt States None      Subjective:  Chart reviewed prior to seeing him in my office.  He was seen most recently on May 1.  Adderall 30 mg twice daily is working well for his ADHD.  He has felt more anxious recently and would like to change Xanax to 1 mg twice daily as needed.  Insomnia is a problem and he would like to restart Seroquel.    Objective:  He is alert, oriented, and cooperative.  Relatedness is good.  Grooming is good.  Speech is normal rate.  Anxious.  Memory is good.  Insight and judgment are good.  No indication of psychotic thinking.    Current Risk:       Suicidal: Not suicidal       Homicidal: Not homicidal       Self-Harm: No plan to harm self       Relapse: (Low/Moderate/High): Moderate       Crisis Safety Plan Reviewed: Discussed with patient    Diagnosis:   ADHD  Generalized anxiety disorder  Insomnia    Treatment Plan:  The current treatment plan consists of quarterly psychiatric sessions designed to evaluate his ADHD, anxiety, and insomnia.    Duration will be for a minimum of 12 months and will be reviewed at each visit.    Goals: Improvement in ADHD symptoms with control of anxiety and improvement in sleep in order to prevent relapse due to the chronic nature of his behavioral health problems and mental illness.  Continue Adderall 30 mg twice daily.  Change Xanax to 1 mg twice daily.  Restart Seroquel 200 mg at bedtime for insomnia      Lenny Holguin M.D.      This note was created using voice recognition software (Dragon). The  accuracy of the dictation is limited by the abilities of the software. I have reviewed the note prior to signing, however some errors in grammar and context are still possible. If you have any questions related to this note please do not hesitate to contact our office.

## 2024-08-29 DIAGNOSIS — F41.1 GENERALIZED ANXIETY DISORDER: ICD-10-CM

## 2024-08-29 DIAGNOSIS — F90.2 ATTENTION DEFICIT HYPERACTIVITY DISORDER, COMBINED TYPE: ICD-10-CM

## 2024-08-29 RX ORDER — DEXTROAMPHETAMINE SACCHARATE, AMPHETAMINE ASPARTATE, DEXTROAMPHETAMINE SULFATE AND AMPHETAMINE SULFATE 7.5; 7.5; 7.5; 7.5 MG/1; MG/1; MG/1; MG/1
30 TABLET ORAL 2 TIMES DAILY
Qty: 60 TABLET | Refills: 0 | Status: SHIPPED | OUTPATIENT
Start: 2024-08-29 | End: 2024-09-28

## 2024-08-29 RX ORDER — QUETIAPINE FUMARATE 200 MG/1
200 TABLET, FILM COATED ORAL NIGHTLY
Qty: 60 TABLET | Refills: 0 | Status: SHIPPED | OUTPATIENT
Start: 2024-08-29 | End: 2024-10-28

## 2024-08-29 RX ORDER — ALPRAZOLAM 1 MG
1 TABLET ORAL 2 TIMES DAILY
Qty: 60 TABLET | Refills: 0 | Status: SHIPPED | OUTPATIENT
Start: 2024-08-29 | End: 2024-09-28

## 2024-08-29 NOTE — TELEPHONE ENCOUNTER
Received request via: Patient    Was the patient seen in the last year in this department? Yes    Does the patient have an active prescription (recently filled or refills available) for medication(s) requested? No    Pharmacy Name: [facility]     Does the patient have [facility] and need 100-day supply? (This applies to ALL medications) Patient does not have SCP

## 2024-09-30 DIAGNOSIS — F41.1 GENERALIZED ANXIETY DISORDER: ICD-10-CM

## 2024-09-30 DIAGNOSIS — F90.2 ATTENTION DEFICIT HYPERACTIVITY DISORDER, COMBINED TYPE: ICD-10-CM

## 2024-10-01 RX ORDER — DEXTROAMPHETAMINE SACCHARATE, AMPHETAMINE ASPARTATE, DEXTROAMPHETAMINE SULFATE AND AMPHETAMINE SULFATE 7.5; 7.5; 7.5; 7.5 MG/1; MG/1; MG/1; MG/1
30 TABLET ORAL 2 TIMES DAILY
Qty: 60 TABLET | Refills: 0 | Status: SHIPPED | OUTPATIENT
Start: 2024-10-01 | End: 2024-10-31 | Stop reason: SDUPTHER

## 2024-10-01 RX ORDER — QUETIAPINE FUMARATE 200 MG/1
200 TABLET, FILM COATED ORAL NIGHTLY
Qty: 60 TABLET | Refills: 0 | Status: SHIPPED | OUTPATIENT
Start: 2024-10-01 | End: 2024-10-31 | Stop reason: SDUPTHER

## 2024-10-01 RX ORDER — ALPRAZOLAM 1 MG/1
1 TABLET ORAL 2 TIMES DAILY
Qty: 60 TABLET | Refills: 0 | Status: SHIPPED | OUTPATIENT
Start: 2024-10-01 | End: 2024-10-31 | Stop reason: SDUPTHER

## 2024-10-29 ENCOUNTER — APPOINTMENT (OUTPATIENT)
Dept: BEHAVIORAL HEALTH | Facility: CLINIC | Age: 39
End: 2024-10-29

## 2024-10-31 ENCOUNTER — OFFICE VISIT (OUTPATIENT)
Dept: BEHAVIORAL HEALTH | Facility: CLINIC | Age: 39
End: 2024-10-31

## 2024-10-31 DIAGNOSIS — F51.01 PRIMARY INSOMNIA: ICD-10-CM

## 2024-10-31 DIAGNOSIS — F90.2 ATTENTION DEFICIT HYPERACTIVITY DISORDER, COMBINED TYPE: ICD-10-CM

## 2024-10-31 DIAGNOSIS — F41.1 GENERALIZED ANXIETY DISORDER: ICD-10-CM

## 2024-10-31 RX ORDER — ALPRAZOLAM 1 MG/1
1 TABLET ORAL 3 TIMES DAILY
Qty: 90 TABLET | Refills: 0 | Status: SHIPPED | OUTPATIENT
Start: 2024-10-31 | End: 2024-11-30

## 2024-10-31 RX ORDER — QUETIAPINE FUMARATE 200 MG/1
200 TABLET, FILM COATED ORAL NIGHTLY
Qty: 90 TABLET | Refills: 0 | Status: SHIPPED | OUTPATIENT
Start: 2024-10-31 | End: 2025-01-29

## 2024-10-31 RX ORDER — DEXTROAMPHETAMINE SACCHARATE, AMPHETAMINE ASPARTATE, DEXTROAMPHETAMINE SULFATE AND AMPHETAMINE SULFATE 7.5; 7.5; 7.5; 7.5 MG/1; MG/1; MG/1; MG/1
30 TABLET ORAL 2 TIMES DAILY
Qty: 60 TABLET | Refills: 0 | Status: SHIPPED | OUTPATIENT
Start: 2024-10-31 | End: 2024-11-30

## 2024-12-03 DIAGNOSIS — F90.2 ATTENTION DEFICIT HYPERACTIVITY DISORDER, COMBINED TYPE: ICD-10-CM

## 2024-12-03 DIAGNOSIS — F41.1 GENERALIZED ANXIETY DISORDER: ICD-10-CM

## 2024-12-03 RX ORDER — ALPRAZOLAM 1 MG/1
1 TABLET ORAL 3 TIMES DAILY
Qty: 90 TABLET | Refills: 0 | Status: SHIPPED | OUTPATIENT
Start: 2024-12-03 | End: 2024-12-09 | Stop reason: SDUPTHER

## 2024-12-03 RX ORDER — QUETIAPINE FUMARATE 200 MG/1
200 TABLET, FILM COATED ORAL NIGHTLY
Qty: 90 TABLET | Refills: 0 | Status: SHIPPED | OUTPATIENT
Start: 2024-12-03 | End: 2024-12-09 | Stop reason: SDUPTHER

## 2024-12-03 RX ORDER — DEXTROAMPHETAMINE SACCHARATE, AMPHETAMINE ASPARTATE, DEXTROAMPHETAMINE SULFATE AND AMPHETAMINE SULFATE 7.5; 7.5; 7.5; 7.5 MG/1; MG/1; MG/1; MG/1
30 TABLET ORAL 2 TIMES DAILY
Qty: 60 TABLET | Refills: 0 | Status: SHIPPED | OUTPATIENT
Start: 2024-12-03 | End: 2024-12-09 | Stop reason: SDUPTHER

## 2024-12-03 NOTE — TELEPHONE ENCOUNTER
Received request via: Patient    Was the patient seen in the last year in this department? Yes    Does the patient have an active prescription (recently filled or refills available) for medication(s) requested? No    Pharmacy Name: [facility]    Does the patient have [facility] and need 100-day supply? (This applies to ALL medications) Patient does not have SCP

## 2024-12-09 DIAGNOSIS — F41.1 GENERALIZED ANXIETY DISORDER: ICD-10-CM

## 2024-12-09 DIAGNOSIS — F90.2 ATTENTION DEFICIT HYPERACTIVITY DISORDER, COMBINED TYPE: ICD-10-CM

## 2024-12-09 DIAGNOSIS — J02.9 EXUDATIVE PHARYNGITIS: ICD-10-CM

## 2024-12-09 RX ORDER — QUETIAPINE FUMARATE 200 MG/1
200 TABLET, FILM COATED ORAL NIGHTLY
Qty: 90 TABLET | Refills: 0 | Status: SHIPPED | OUTPATIENT
Start: 2024-12-09 | End: 2025-03-09

## 2024-12-09 RX ORDER — ALPRAZOLAM 1 MG/1
1 TABLET ORAL 3 TIMES DAILY
Qty: 90 TABLET | Refills: 0 | Status: SHIPPED | OUTPATIENT
Start: 2024-12-09 | End: 2025-01-08

## 2024-12-09 RX ORDER — DEXTROAMPHETAMINE SACCHARATE, AMPHETAMINE ASPARTATE, DEXTROAMPHETAMINE SULFATE AND AMPHETAMINE SULFATE 7.5; 7.5; 7.5; 7.5 MG/1; MG/1; MG/1; MG/1
30 TABLET ORAL 2 TIMES DAILY
Qty: 60 TABLET | Refills: 0 | Status: SHIPPED | OUTPATIENT
Start: 2024-12-09 | End: 2025-01-08

## 2025-01-10 DIAGNOSIS — F41.1 GENERALIZED ANXIETY DISORDER: ICD-10-CM

## 2025-01-10 DIAGNOSIS — F90.2 ATTENTION DEFICIT HYPERACTIVITY DISORDER, COMBINED TYPE: ICD-10-CM

## 2025-01-10 NOTE — TELEPHONE ENCOUNTER
Received request via: Patient    Was the patient seen in the last year in this department? Yes    Does the patient have an active prescription (recently filled or refills available) for medication(s) requested? No    Pharmacy Name: [facility]    Does the patient have [facility] and need 100-day supply? (This applies to ALL medications) Patient does not have SCP

## 2025-01-13 RX ORDER — ALPRAZOLAM 1 MG/1
1 TABLET ORAL 3 TIMES DAILY
Qty: 90 TABLET | Refills: 0 | Status: SHIPPED | OUTPATIENT
Start: 2025-01-13 | End: 2025-02-12

## 2025-01-13 RX ORDER — DEXTROAMPHETAMINE SACCHARATE, AMPHETAMINE ASPARTATE, DEXTROAMPHETAMINE SULFATE AND AMPHETAMINE SULFATE 7.5; 7.5; 7.5; 7.5 MG/1; MG/1; MG/1; MG/1
30 TABLET ORAL 2 TIMES DAILY
Qty: 60 TABLET | Refills: 0 | Status: SHIPPED | OUTPATIENT
Start: 2025-01-13 | End: 2025-02-12

## 2025-01-31 ENCOUNTER — APPOINTMENT (OUTPATIENT)
Dept: BEHAVIORAL HEALTH | Facility: CLINIC | Age: 40
End: 2025-01-31
Payer: MEDICAID

## 2025-02-06 DIAGNOSIS — F41.1 GENERALIZED ANXIETY DISORDER: ICD-10-CM

## 2025-02-06 DIAGNOSIS — F90.2 ATTENTION DEFICIT HYPERACTIVITY DISORDER, COMBINED TYPE: ICD-10-CM

## 2025-02-06 RX ORDER — DEXTROAMPHETAMINE SACCHARATE, AMPHETAMINE ASPARTATE, DEXTROAMPHETAMINE SULFATE AND AMPHETAMINE SULFATE 7.5; 7.5; 7.5; 7.5 MG/1; MG/1; MG/1; MG/1
30 TABLET ORAL 2 TIMES DAILY
Qty: 10 TABLET | Refills: 0 | Status: SHIPPED | OUTPATIENT
Start: 2025-02-06 | End: 2025-02-10 | Stop reason: SDUPTHER

## 2025-02-06 RX ORDER — ALPRAZOLAM 1 MG/1
1 TABLET ORAL 3 TIMES DAILY
Qty: 15 TABLET | Refills: 0 | Status: SHIPPED | OUTPATIENT
Start: 2025-02-06 | End: 2025-02-10 | Stop reason: SDUPTHER

## 2025-02-06 NOTE — TELEPHONE ENCOUNTER
Received request via: Patient    Was the patient seen in the last year in this department? Yes    Does the patient have an active prescription (recently filled or refills available) for medication(s) requested? No    Pharmacy Name: [facility]    Does the patient have [facility] and need 100-day supply? (This applies to ALL medications) Patient does not have SCP

## 2025-02-10 ENCOUNTER — OFFICE VISIT (OUTPATIENT)
Dept: BEHAVIORAL HEALTH | Facility: CLINIC | Age: 40
End: 2025-02-10
Payer: MEDICAID

## 2025-02-10 DIAGNOSIS — F41.1 GENERALIZED ANXIETY DISORDER: ICD-10-CM

## 2025-02-10 DIAGNOSIS — F90.2 ATTENTION DEFICIT HYPERACTIVITY DISORDER, COMBINED TYPE: ICD-10-CM

## 2025-02-10 DIAGNOSIS — F51.01 PRIMARY INSOMNIA: ICD-10-CM

## 2025-02-10 RX ORDER — ALPRAZOLAM 1 MG/1
1 TABLET ORAL 3 TIMES DAILY
Qty: 90 TABLET | Refills: 0 | Status: SHIPPED | OUTPATIENT
Start: 2025-02-10 | End: 2025-03-12

## 2025-02-10 RX ORDER — QUETIAPINE FUMARATE 200 MG/1
200 TABLET, FILM COATED ORAL NIGHTLY
Qty: 90 TABLET | Refills: 0 | Status: SHIPPED | OUTPATIENT
Start: 2025-02-10 | End: 2025-05-11

## 2025-02-10 RX ORDER — DEXTROAMPHETAMINE SACCHARATE, AMPHETAMINE ASPARTATE, DEXTROAMPHETAMINE SULFATE AND AMPHETAMINE SULFATE 7.5; 7.5; 7.5; 7.5 MG/1; MG/1; MG/1; MG/1
30 TABLET ORAL 2 TIMES DAILY
Qty: 60 TABLET | Refills: 0 | Status: SHIPPED | OUTPATIENT
Start: 2025-02-10 | End: 2025-03-12

## 2025-02-10 NOTE — PROGRESS NOTES
[facility]   Follow Up Assessment     This provider informed the patient their medical records are totally confidential except for the use by other providers involved in their care, or if the patient signs a release, or to report instances of child or elder abuse, or if it is determined they are an immediate risk to harm themselves or others.    Name: Gorge Bailey  MRN: [id number]  : 1985  Age: 39 y.o.  Date of assessment: 2/10/2025  PCP: Pcp Pt States None      Subjective:  Chart reviewed prior to seeing him in my office.  He was last seen on .  He enjoyed his trip to [place] spending 5 weeks there from November until early December.  We reviewed his current medication combination, purposes, doses, etc. He does not wish to make any changes in medications but would like to change pharmacies.    Objective:  He is alert, oriented, and cooperative.  Relatedness is good.  Grooming is good.  Speech is normal rate.  Anxious.  Memory is good.  Insight and judgment are good.  No indication of psychotic thinking.    Current Risk:       Suicidal: Not suicidal       Homicidal: Not homicidal       Self-Harm: No plan to harm self       Relapse: (Low/Moderate/High): Moderate       Crisis Safety Plan Reviewed: Discussed with patient    Diagnosis:   ADHD  Generalized anxiety disorder  Insomnia    Treatment Plan:  The current treatment plan consists of quarterly psychiatric sessions designed to evaluate his ADHD, anxiety, and insomnia.    Duration will be for a minimum of 12 months and will be reviewed at each visit.    Goals: Improvement in ADHD symptoms with control of anxiety and improvement in sleep in order to prevent relapse due to the chronic nature of his behavioral health problems and mental illness.  Continue Adderall 30 mg twice daily.  Use Xanax 1 mg 3 times daily.  Continue Seroquel 200 mg at bedtime.    Lenny Holguin M.D.      This note was created using voice recognition software  (Helene). The accuracy of the dictation is limited by the abilities of the software. I have reviewed the note prior to signing, however some errors in grammar and context are still possible. If you have any questions related to this note please do not hesitate to contact our office.

## 2025-03-06 DIAGNOSIS — F41.1 GENERALIZED ANXIETY DISORDER: ICD-10-CM

## 2025-03-06 DIAGNOSIS — F90.2 ATTENTION DEFICIT HYPERACTIVITY DISORDER, COMBINED TYPE: ICD-10-CM

## 2025-03-06 RX ORDER — ALPRAZOLAM 1 MG/1
1 TABLET ORAL 3 TIMES DAILY
Qty: 90 TABLET | Refills: 0 | Status: SHIPPED | OUTPATIENT
Start: 2025-03-06 | End: 2025-04-05

## 2025-03-06 RX ORDER — DEXTROAMPHETAMINE SACCHARATE, AMPHETAMINE ASPARTATE, DEXTROAMPHETAMINE SULFATE AND AMPHETAMINE SULFATE 7.5; 7.5; 7.5; 7.5 MG/1; MG/1; MG/1; MG/1
30 TABLET ORAL 2 TIMES DAILY
Qty: 60 TABLET | Refills: 0 | Status: SHIPPED | OUTPATIENT
Start: 2025-03-06 | End: 2025-04-05

## 2025-03-06 RX ORDER — QUETIAPINE FUMARATE 200 MG/1
200 TABLET, FILM COATED ORAL NIGHTLY
Qty: 90 TABLET | Refills: 0 | Status: SHIPPED | OUTPATIENT
Start: 2025-03-06 | End: 2025-06-04

## 2025-03-06 NOTE — TELEPHONE ENCOUNTER
Received request via: Patient    Was the patient seen in the last year in this department? Yes    Does the patient have an active prescription (recently filled or refills available) for medication(s) requested? No    Pharmacy Name: [facility]    Does the patient have [facility] and need 100-day supply? (This applies to ALL medications) Patient does not have SCP

## 2025-04-11 DIAGNOSIS — F90.2 ATTENTION DEFICIT HYPERACTIVITY DISORDER, COMBINED TYPE: ICD-10-CM

## 2025-04-11 DIAGNOSIS — F41.1 GENERALIZED ANXIETY DISORDER: ICD-10-CM

## 2025-04-11 RX ORDER — ALPRAZOLAM 1 MG/1
1 TABLET ORAL 3 TIMES DAILY
Qty: 90 TABLET | Refills: 0 | Status: SHIPPED | OUTPATIENT
Start: 2025-04-11 | End: 2025-05-11

## 2025-04-11 RX ORDER — DEXTROAMPHETAMINE SACCHARATE, AMPHETAMINE ASPARTATE, DEXTROAMPHETAMINE SULFATE AND AMPHETAMINE SULFATE 7.5; 7.5; 7.5; 7.5 MG/1; MG/1; MG/1; MG/1
30 TABLET ORAL 2 TIMES DAILY
Qty: 60 TABLET | Refills: 0 | Status: SHIPPED | OUTPATIENT
Start: 2025-04-11 | End: 2025-05-11

## 2025-04-11 RX ORDER — QUETIAPINE FUMARATE 200 MG/1
200 TABLET, FILM COATED ORAL NIGHTLY
Qty: 90 TABLET | Refills: 0 | Status: SHIPPED | OUTPATIENT
Start: 2025-04-11 | End: 2025-07-10

## 2025-04-11 NOTE — TELEPHONE ENCOUNTER
Received request via: Patient    Was the patient seen in the last year in this department? Yes    Does the patient have an active prescription (recently filled or refills available) for medication(s) requested? No    Pharmacy Name: [facility]    Does the patient have [facility] and need 100-day supply? (This applies to ALL medications) Patient does not have SCP

## 2025-05-07 DIAGNOSIS — F41.1 GENERALIZED ANXIETY DISORDER: ICD-10-CM

## 2025-05-07 DIAGNOSIS — F90.2 ATTENTION DEFICIT HYPERACTIVITY DISORDER, COMBINED TYPE: ICD-10-CM

## 2025-05-07 RX ORDER — QUETIAPINE FUMARATE 200 MG/1
200 TABLET, FILM COATED ORAL NIGHTLY
Qty: 90 TABLET | Refills: 0 | Status: SHIPPED | OUTPATIENT
Start: 2025-05-07 | End: 2025-05-12 | Stop reason: SDUPTHER

## 2025-05-07 RX ORDER — ALPRAZOLAM 1 MG/1
1 TABLET ORAL 3 TIMES DAILY
Qty: 90 TABLET | Refills: 0 | Status: SHIPPED | OUTPATIENT
Start: 2025-05-07 | End: 2025-05-12 | Stop reason: SDUPTHER

## 2025-05-07 RX ORDER — DEXTROAMPHETAMINE SACCHARATE, AMPHETAMINE ASPARTATE, DEXTROAMPHETAMINE SULFATE AND AMPHETAMINE SULFATE 7.5; 7.5; 7.5; 7.5 MG/1; MG/1; MG/1; MG/1
30 TABLET ORAL 2 TIMES DAILY
Qty: 60 TABLET | Refills: 0 | Status: SHIPPED | OUTPATIENT
Start: 2025-05-07 | End: 2025-05-12 | Stop reason: SDUPTHER

## 2025-05-07 NOTE — TELEPHONE ENCOUNTER
Received request via: Patient    Was the patient seen in the last year in this department? Yes    Does the patient have an active prescription (recently filled or refills available) for medication(s) requested? No    Pharmacy Name: [facility]    Does the patient have [facility] and need 100-day supply? (This applies to ALL medications) Patient does not have SCP

## 2025-05-12 ENCOUNTER — OFFICE VISIT (OUTPATIENT)
Dept: BEHAVIORAL HEALTH | Facility: CLINIC | Age: 40
End: 2025-05-12
Payer: MEDICAID

## 2025-05-12 DIAGNOSIS — F51.01 PRIMARY INSOMNIA: ICD-10-CM

## 2025-05-12 DIAGNOSIS — F41.1 GENERALIZED ANXIETY DISORDER: ICD-10-CM

## 2025-05-12 DIAGNOSIS — F90.2 ATTENTION DEFICIT HYPERACTIVITY DISORDER, COMBINED TYPE: ICD-10-CM

## 2025-05-12 PROCEDURE — 99214 OFFICE O/P EST MOD 30 MIN: CPT | Performed by: PSYCHIATRY & NEUROLOGY

## 2025-05-12 RX ORDER — DEXTROAMPHETAMINE SACCHARATE, AMPHETAMINE ASPARTATE, DEXTROAMPHETAMINE SULFATE AND AMPHETAMINE SULFATE 7.5; 7.5; 7.5; 7.5 MG/1; MG/1; MG/1; MG/1
30 TABLET ORAL 2 TIMES DAILY
Qty: 60 TABLET | Refills: 0 | Status: SHIPPED | OUTPATIENT
Start: 2025-05-12 | End: 2025-06-11

## 2025-05-12 RX ORDER — ALPRAZOLAM 1 MG/1
1 TABLET ORAL 3 TIMES DAILY
Qty: 90 TABLET | Refills: 0 | Status: SHIPPED | OUTPATIENT
Start: 2025-05-12 | End: 2025-06-11

## 2025-05-12 RX ORDER — QUETIAPINE FUMARATE 200 MG/1
200 TABLET, FILM COATED ORAL NIGHTLY
Qty: 90 TABLET | Refills: 0 | Status: SHIPPED | OUTPATIENT
Start: 2025-05-12 | End: 2025-08-10

## 2025-05-12 NOTE — PROGRESS NOTES
Please tell her very small spots seen on the lungs which are calcified spots but otherwise nothing abnormal seen   [facility]   Follow Up Assessment     This provider informed the patient their medical records are totally confidential except for the use by other providers involved in their care, or if the patient signs a release, or to report instances of child or elder abuse, or if it is determined they are an immediate risk to harm themselves or others.    Name: Gorge Bailey  MRN: [id number]  : 1985  Age: 40 y.o.  Date of assessment: 2025  PCP: Pcp Pt States None      Subjective:  Chart reviewed prior to seeing him in my office.  He was last seen on February 10.  We reviewed his current medication combination, purposes, doses, etc.  No medication changes requested.    Objective:  He is alert, oriented, and cooperative.  Relatedness is good.  Grooming is good.  Speech is normal rate.  Anxious.  Memory is good.  Insight and judgment are good.  No indication of psychotic thinking.    Current Risk:       Suicidal: Not suicidal       Homicidal: Not homicidal       Self-Harm: No plan to harm self       Relapse: (Low/Moderate/High): Moderate       Crisis Safety Plan Reviewed: Discussed with patient    Diagnosis:   ADHD  Generalized anxiety disorder  Insomnia    Treatment Plan:  The current treatment plan consists of quarterly psychiatric sessions designed to evaluate his ADHD, anxiety, and insomnia.    Duration will be for a minimum of 12 months and will be reviewed at each visit.    Goals: Improvement in ADHD symptoms and sleep with control of anxiety in order to prevent relapse due to the chronic nature of his behavioral health problems and mental illness.  Continue Adderall 30 mg twice daily.  Continue Xanax 1 mg 3 times daily.  Continue Seroquel 200 mg at bedtime.    Lenny Holguin M.D.      This note was created using voice recognition software (Dragon). The accuracy of the dictation is limited by the abilities of the software. I have reviewed the note prior to signing, however some errors in grammar  and context are still possible. If you have any questions related to this note please do not hesitate to contact our office.

## 2025-05-17 ENCOUNTER — HOSPITAL ENCOUNTER (EMERGENCY)
Facility: MEDICAL CENTER | Age: 40
End: 2025-05-17
Attending: EMERGENCY MEDICINE
Payer: MEDICAID

## 2025-05-17 ENCOUNTER — APPOINTMENT (OUTPATIENT)
Dept: RADIOLOGY | Facility: MEDICAL CENTER | Age: 40
End: 2025-05-17
Attending: EMERGENCY MEDICINE
Payer: MEDICAID

## 2025-05-17 VITALS
HEART RATE: 85 BPM | DIASTOLIC BLOOD PRESSURE: 52 MMHG | BODY MASS INDEX: 22.35 KG/M2 | SYSTOLIC BLOOD PRESSURE: 90 MMHG | TEMPERATURE: 99.3 F | RESPIRATION RATE: 16 BRPM | HEIGHT: 72 IN | OXYGEN SATURATION: 94 % | WEIGHT: 165 LBS

## 2025-05-17 DIAGNOSIS — T50.901A ACCIDENTAL OVERDOSE, INITIAL ENCOUNTER: Primary | ICD-10-CM

## 2025-05-17 LAB
ALBUMIN SERPL BCP-MCNC: 4.4 G/DL (ref 3.2–4.9)
ALBUMIN/GLOB SERPL: 1.8 G/DL
ALP SERPL-CCNC: 113 U/L (ref 30–99)
ALT SERPL-CCNC: 193 U/L (ref 2–50)
AMPHET UR QL SCN: NEGATIVE
ANION GAP SERPL CALC-SCNC: 13 MMOL/L (ref 7–16)
APAP SERPL-MCNC: <5 UG/ML (ref 10–30)
AST SERPL-CCNC: 477 U/L (ref 12–45)
BARBITURATES UR QL SCN: NEGATIVE
BASOPHILS # BLD AUTO: 0.3 % (ref 0–1.8)
BASOPHILS # BLD: 0.04 K/UL (ref 0–0.12)
BENZODIAZ UR QL SCN: POSITIVE
BILIRUB SERPL-MCNC: 0.7 MG/DL (ref 0.1–1.5)
BUN SERPL-MCNC: 22 MG/DL (ref 8–22)
BZE UR QL SCN: POSITIVE
CALCIUM ALBUM COR SERPL-MCNC: 8.8 MG/DL (ref 8.5–10.5)
CALCIUM SERPL-MCNC: 9.1 MG/DL (ref 8.5–10.5)
CANNABINOIDS UR QL SCN: NEGATIVE
CHLORIDE SERPL-SCNC: 104 MMOL/L (ref 96–112)
CO2 SERPL-SCNC: 24 MMOL/L (ref 20–33)
CREAT SERPL-MCNC: 1.12 MG/DL (ref 0.5–1.4)
EKG IMPRESSION: NORMAL
EOSINOPHIL # BLD AUTO: 0.04 K/UL (ref 0–0.51)
EOSINOPHIL NFR BLD: 0.3 % (ref 0–6.9)
ERYTHROCYTE [DISTWIDTH] IN BLOOD BY AUTOMATED COUNT: 43 FL (ref 35.9–50)
ETHANOL BLD-MCNC: <10.1 MG/DL
FENTANYL UR QL: NEGATIVE
GFR SERPLBLD CREATININE-BSD FMLA CKD-EPI: 86 ML/MIN/1.73 M 2
GLOBULIN SER CALC-MCNC: 2.5 G/DL (ref 1.9–3.5)
GLUCOSE SERPL-MCNC: 102 MG/DL (ref 65–99)
HAV IGM SERPL QL IA: NORMAL
HBV CORE IGM SER QL: NORMAL
HBV SURFACE AG SER QL: NORMAL
HCT VFR BLD AUTO: 50.5 % (ref 42–52)
HCV AB SER QL: NORMAL
HGB BLD-MCNC: 16.8 G/DL (ref 14–18)
IMM GRANULOCYTES # BLD AUTO: 0.07 K/UL (ref 0–0.11)
IMM GRANULOCYTES NFR BLD AUTO: 0.5 % (ref 0–0.9)
LYMPHOCYTES # BLD AUTO: 0.97 K/UL (ref 1–4.8)
LYMPHOCYTES NFR BLD: 6.9 % (ref 22–41)
MAGNESIUM SERPL-MCNC: 2.1 MG/DL (ref 1.5–2.5)
MCH RBC QN AUTO: 29.4 PG (ref 27–33)
MCHC RBC AUTO-ENTMCNC: 33.3 G/DL (ref 32.3–36.5)
MCV RBC AUTO: 88.3 FL (ref 81.4–97.8)
METHADONE UR QL SCN: POSITIVE
MONOCYTES # BLD AUTO: 0.68 K/UL (ref 0–0.85)
MONOCYTES NFR BLD AUTO: 4.8 % (ref 0–13.4)
NEUTROPHILS # BLD AUTO: 12.3 K/UL (ref 1.82–7.42)
NEUTROPHILS NFR BLD: 87.2 % (ref 44–72)
NRBC # BLD AUTO: 0 K/UL
NRBC BLD-RTO: 0 /100 WBC (ref 0–0.2)
OPIATES UR QL SCN: POSITIVE
OXYCODONE UR QL SCN: NEGATIVE
PCP UR QL SCN: NEGATIVE
PLATELET # BLD AUTO: 240 K/UL (ref 164–446)
PMV BLD AUTO: 9.9 FL (ref 9–12.9)
POTASSIUM SERPL-SCNC: 4.4 MMOL/L (ref 3.6–5.5)
PROPOXYPH UR QL SCN: NEGATIVE
PROT SERPL-MCNC: 6.9 G/DL (ref 6–8.2)
RBC # BLD AUTO: 5.72 M/UL (ref 4.7–6.1)
SALICYLATES SERPL-MCNC: <1 MG/DL (ref 15–25)
SODIUM SERPL-SCNC: 141 MMOL/L (ref 135–145)
WBC # BLD AUTO: 14.1 K/UL (ref 4.8–10.8)

## 2025-05-17 PROCEDURE — 83735 ASSAY OF MAGNESIUM: CPT

## 2025-05-17 PROCEDURE — 80053 COMPREHEN METABOLIC PANEL: CPT

## 2025-05-17 PROCEDURE — 71045 X-RAY EXAM CHEST 1 VIEW: CPT

## 2025-05-17 PROCEDURE — 93005 ELECTROCARDIOGRAM TRACING: CPT | Mod: TC | Performed by: EMERGENCY MEDICINE

## 2025-05-17 PROCEDURE — 80179 DRUG ASSAY SALICYLATE: CPT

## 2025-05-17 PROCEDURE — 82077 ASSAY SPEC XCP UR&BREATH IA: CPT

## 2025-05-17 PROCEDURE — 36415 COLL VENOUS BLD VENIPUNCTURE: CPT

## 2025-05-17 PROCEDURE — 80143 DRUG ASSAY ACETAMINOPHEN: CPT

## 2025-05-17 PROCEDURE — 80074 ACUTE HEPATITIS PANEL: CPT

## 2025-05-17 PROCEDURE — 99284 EMERGENCY DEPT VISIT MOD MDM: CPT

## 2025-05-17 PROCEDURE — 85025 COMPLETE CBC W/AUTO DIFF WBC: CPT

## 2025-05-17 PROCEDURE — 80307 DRUG TEST PRSMV CHEM ANLYZR: CPT

## 2025-05-17 NOTE — ED TRIAGE NOTES
Chief Complaint   Patient presents with    ALOC     BIBA from home, found with blue lips and fingers at 0930, friend administered 4mg Narcan IN with positive response.  Pt denies drug use.      Vitals:    05/17/25 1045   BP: 110/80   Pulse: 75   Resp: 18   Temp: 37.4 °C (99.3 °F)   SpO2: 96%

## 2025-05-17 NOTE — ED PROVIDER NOTES
ED PHYSICIAN NOTE    CHIEF COMPLAINT  Chief Complaint   Patient presents with    ALOC     BIBA from home, found with blue lips and fingers at 0930, friend administered 4mg Narcan IN with positive response.  Pt denies drug use.        EXTERNAL RECORDS REVIEWED  Two prior ED encounters with substance abuse    HPI/ROS  LIMITATION TO HISTORY   Patient is somnolent  OUTSIDE HISTORIAN(S):  Friend reports that she found the patient this morning at 930 unresponsive with blue hands and face.  She gave him Narcan and he woke up vomited and was agitated for bit.  He was able to relax since then and has gradually gone back to sleep.    Gorge Bailey is a 40 y.o. male who presents with altered mental status.  History as noted above.  The patient says he does not know what he took.  He reports he is thirsty.  He denies any pain.  No headache, chest pain, shortness of breath abdominal pain.  No known seizure.  Has not had any trauma.    PAST MEDICAL HISTORY  Past Medical History[1] ADHD, anxiety, insomnia    SOCIAL HISTORY  Social History[2]    CURRENT MEDICATIONS  Home Medications       Reviewed by Reginaldo Siu R.N. (Registered Nurse) on 05/17/25 at 1046  Med List Status: Partial     Medication Last Dose Status   ALPRAZolam (XANAX) 1 MG Tab  Active   amphetamine-dextroamphetamine (ADDERALL, 30MG,) 30 MG tablet  Active   lidocaine (XYLOCAINE) 2 % Solution  Active   QUEtiapine (SEROQUEL) 200 MG Tab  Active                    ALLERGIES  Allergies[3]    PHYSICAL EXAM  VITAL SIGNS: /80   Pulse 75   Temp 37.4 °C (99.3 °F) (Tympanic)   Resp 18   Ht 1.829 m (6')   Wt 74.8 kg (165 lb)   SpO2 96%   BMI 22.38 kg/m²    Constitutional: Somnolent but arousable  HENT: Normal inspection  Eyes: Pupils equal reactive  Neck: Grossly normal range of motion.  Cardiovascular: Elevated heart rate, Normal rhythm.  Symmetric peripheral pulses.   Thorax & Lungs: No respiratory distress, No wheezing, No rales, No rhonchi, No chest  tenderness.   Abdomen: Bowel sounds normal, soft, non-distended, nontender, no mass  Skin: No rash.  No track marks  Back: No tenderness, No CVA tenderness.   Extremities: No clubbing, cyanosis, edema, no Homans or cords.  Neurologic: Patient is tired and confused about the events.  Face is symmetric.  Moves all extremities symmetrically    DIAGNOSTIC STUDIES / PROCEDURES  LABS/EKG  Results for orders placed or performed during the hospital encounter of 05/17/25   CBC WITH DIFFERENTIAL    Collection Time: 05/17/25 11:30 AM   Result Value Ref Range    WBC 14.1 (H) 4.8 - 10.8 K/uL    RBC 5.72 4.70 - 6.10 M/uL    Hemoglobin 16.8 14.0 - 18.0 g/dL    Hematocrit 50.5 42.0 - 52.0 %    MCV 88.3 81.4 - 97.8 fL    MCH 29.4 27.0 - 33.0 pg    MCHC 33.3 32.3 - 36.5 g/dL    RDW 43.0 35.9 - 50.0 fL    Platelet Count 240 164 - 446 K/uL    MPV 9.9 9.0 - 12.9 fL    Neutrophils-Polys 87.20 (H) 44.00 - 72.00 %    Lymphocytes 6.90 (L) 22.00 - 41.00 %    Monocytes 4.80 0.00 - 13.40 %    Eosinophils 0.30 0.00 - 6.90 %    Basophils 0.30 0.00 - 1.80 %    Immature Granulocytes 0.50 0.00 - 0.90 %    Nucleated RBC 0.00 0.00 - 0.20 /100 WBC    Neutrophils (Absolute) 12.30 (H) 1.82 - 7.42 K/uL    Lymphs (Absolute) 0.97 (L) 1.00 - 4.80 K/uL    Monos (Absolute) 0.68 0.00 - 0.85 K/uL    Eos (Absolute) 0.04 0.00 - 0.51 K/uL    Baso (Absolute) 0.04 0.00 - 0.12 K/uL    Immature Granulocytes (abs) 0.07 0.00 - 0.11 K/uL    NRBC (Absolute) 0.00 K/uL   COMP METABOLIC PANEL    Collection Time: 05/17/25 11:30 AM   Result Value Ref Range    Sodium 141 135 - 145 mmol/L    Potassium 4.4 3.6 - 5.5 mmol/L    Chloride 104 96 - 112 mmol/L    Co2 24 20 - 33 mmol/L    Anion Gap 13.0 7.0 - 16.0    Glucose 102 (H) 65 - 99 mg/dL    Bun 22 8 - 22 mg/dL    Creatinine 1.12 0.50 - 1.40 mg/dL    Calcium 9.1 8.5 - 10.5 mg/dL    Correct Calcium 8.8 8.5 - 10.5 mg/dL    AST(SGOT) 477 (H) 12 - 45 U/L    ALT(SGPT) 193 (H) 2 - 50 U/L    Alkaline Phosphatase 113 (H) 30 - 99 U/L     Total Bilirubin 0.7 0.1 - 1.5 mg/dL    Albumin 4.4 3.2 - 4.9 g/dL    Total Protein 6.9 6.0 - 8.2 g/dL    Globulin 2.5 1.9 - 3.5 g/dL    A-G Ratio 1.8 g/dL   MAGNESIUM    Collection Time: 25 11:30 AM   Result Value Ref Range    Magnesium 2.1 1.5 - 2.5 mg/dL   ACETAMINOPHEN    Collection Time: 25 11:30 AM   Result Value Ref Range    Acetaminophen -Tylenol <5.0 (L) 10.0 - 30.0 ug/mL   Salicylate    Collection Time: 25 11:30 AM   Result Value Ref Range    Salicylates, Quant. <1.0 (L) 15.0 - 25.0 mg/dL   DIAGNOSTIC ALCOHOL    Collection Time: 25 11:30 AM   Result Value Ref Range    Diagnostic Alcohol <10.1 <10.1 mg/dL   ESTIMATED GFR    Collection Time: 25 11:30 AM   Result Value Ref Range    GFR (CKD-EPI) 86 >60 mL/min/1.73 m 2   HEPATITIS PANEL ACUTE(4 COMPONENTS)    Collection Time: 25 11:30 AM   Result Value Ref Range    Hepatitis B Surface Antigen Non-Reactive Non-Reactive    Hepatitis B Cors Ab,IgM Non-Reactive Non-Reactive    Hepatitis A Virus Ab, IgM Non-Reactive Non-Reactive    Hepatitis C Antibody Non-Reactive Non-Reactive   EKG (Now)    Collection Time: 25  1:51 PM   Result Value Ref Range    Report       [facility] Emergency Dept.    Test Date:  2025  Pt Name:    WALTER RODRÍGUEZ               Department: ER  MRN:        [id number]                      Room:       [facility]  Gender:     Male                         Technician: [id number]  :        1985                   Requested By:RAMIREZ CROW  Order #:    [id number]                    Reading MD: RAMIREZ CROW MD    Measurements  Intervals                                Axis  Rate:       85                           P:          76  MS:         134                          QRS:        55  QRSD:       98                           T:          54  QT:         426  QTc:        507    Interpretive Statements  Sinus rhythm  Prolonged QT interval  Compared to ECG 09/10/2022 03:19:52  Sinus  tachycardia no longer present  Electronically Signed On 05- 13:51:19 PDT by RAMIREZ CROW MD        I have independently interpreted this EKG as documented above    Rhythm strip interpretation-sinus rhythm    RADIOLOGY  I have independently interpreted the diagnostic imaging associated with this visit and am waiting the final reading from the radiologist.   My preliminary interpretation is as follows: X-ray without infiltrate or edema    COURSE & MEDICAL DECISION MAKING    INITIAL ASSESSMENT, COURSE AND PLAN  Case narrative: Patient presents with altered mental status.  He woke up after receiving Narcan, but is again somnolent on my assessment.  He is arousable easily.  He is protecting his airway.  Vital signs are stable.  He says that he did not take anything.  I ordered medical evaluation.  I do not see any evidence of trauma.  No suggestion of stroke.    EKG with mildly prolonged QT intervals.    Laboratory data leukocytosis likely stress response.  Patient has elevated LFTs of undetermined cause.  He does drink alcohol with increased AST to ALT ratio.  Normal bilirubin no abdominal tenderness.  Acetaminophen level was negative.  Hepatitis panel was negative.  Salicylate negative.  Magnesium normal.  I informed the patient of the elevated liver function test and need for recheck.    Patient observed in the ER over several hours.  He is up awake alert eating and drinking.  Says he does not remember taking anything extra.  Thinks there is a chance he could have taken an extra Xanax but is not sure.  Denies being suicidal.  No hearing voices.    Patient was appropriate, but we are waiting for his urine drug screen result.  I went to talk to him again.  He had walked out of the ER with his IV.  Tried contacting him.  Unavailable.  Police will be notified because he left with an IV.  Otherwise he is medically stable and would have been discharged as requested.        DISPOSITION AND DISCUSSIONS  Escalation  of care considered, and ultimately not performed:acute inpatient care management, however at this time, the patient is most appropriate for outpatient management      FINAL IMPRESSION  1.  Overdose, unspecified    Eloped from the emergency department    This dictation was created using voice recognition software. The accuracy of the dictation is limited to the abilities of the software. I expect there may be some errors of grammar and possibly content. The nursing notes were reviewed and certain aspects of this information were incorporated into this note.    Electronically signed by: Keaton Barry M.D., 5/17/2025             [1] History reviewed. No pertinent past medical history.  [2]   Social History  Tobacco Use    Smoking status: Never    Smokeless tobacco: Never    Tobacco comments:     unknown, pt refusing to answer   Vaping Use    Vaping status: Unknown   Substance Use Topics    Alcohol use: Never   [3] No Known Allergies

## 2025-06-11 DIAGNOSIS — F90.2 ATTENTION DEFICIT HYPERACTIVITY DISORDER, COMBINED TYPE: ICD-10-CM

## 2025-06-11 DIAGNOSIS — F41.1 GENERALIZED ANXIETY DISORDER: ICD-10-CM

## 2025-06-11 NOTE — TELEPHONE ENCOUNTER
Received request via: Patient    Was the patient seen in the last year in this department? Yes    Does the patient have an active prescription (recently filled or refills available) for medication(s) requested? No    Pharmacy Name: [facility]    Does the patient have [facility] and need 100-day supply? (This applies to ALL medications) Patient does not have SCP

## 2025-06-12 RX ORDER — DEXTROAMPHETAMINE SACCHARATE, AMPHETAMINE ASPARTATE, DEXTROAMPHETAMINE SULFATE AND AMPHETAMINE SULFATE 7.5; 7.5; 7.5; 7.5 MG/1; MG/1; MG/1; MG/1
30 TABLET ORAL 2 TIMES DAILY
Qty: 60 TABLET | Refills: 0 | Status: SHIPPED | OUTPATIENT
Start: 2025-06-12 | End: 2025-07-12

## 2025-06-12 RX ORDER — ALPRAZOLAM 1 MG/1
1 TABLET ORAL 3 TIMES DAILY
Qty: 90 TABLET | Refills: 0 | Status: SHIPPED | OUTPATIENT
Start: 2025-06-12 | End: 2025-07-12

## 2025-06-12 RX ORDER — QUETIAPINE FUMARATE 200 MG/1
200 TABLET, FILM COATED ORAL NIGHTLY
Qty: 90 TABLET | Refills: 0 | Status: SHIPPED | OUTPATIENT
Start: 2025-06-12 | End: 2025-09-10

## 2025-07-11 DIAGNOSIS — F41.1 GENERALIZED ANXIETY DISORDER: ICD-10-CM

## 2025-07-11 DIAGNOSIS — F90.2 ATTENTION DEFICIT HYPERACTIVITY DISORDER, COMBINED TYPE: ICD-10-CM

## 2025-07-11 NOTE — TELEPHONE ENCOUNTER
Received request via: Patient    Was the patient seen in the last year in this department? Yes    Does the patient have an active prescription (recently filled or refills available) for medication(s) requested? No    Pharmacy Name: [facility]    Does the patient have [facility] and need 100-day supply? (This applies to ALL medications) Patient does not have SCP

## 2025-07-14 RX ORDER — DEXTROAMPHETAMINE SACCHARATE, AMPHETAMINE ASPARTATE, DEXTROAMPHETAMINE SULFATE AND AMPHETAMINE SULFATE 7.5; 7.5; 7.5; 7.5 MG/1; MG/1; MG/1; MG/1
30 TABLET ORAL 2 TIMES DAILY
Qty: 60 TABLET | Refills: 0 | Status: SHIPPED | OUTPATIENT
Start: 2025-07-14 | End: 2025-08-13

## 2025-07-14 RX ORDER — ALPRAZOLAM 1 MG/1
1 TABLET ORAL 3 TIMES DAILY
Qty: 90 TABLET | Refills: 0 | Status: SHIPPED | OUTPATIENT
Start: 2025-07-14 | End: 2025-08-13

## 2025-08-11 ENCOUNTER — APPOINTMENT (OUTPATIENT)
Dept: BEHAVIORAL HEALTH | Facility: CLINIC | Age: 40
End: 2025-08-11
Payer: MEDICAID